# Patient Record
Sex: FEMALE | URBAN - METROPOLITAN AREA
[De-identification: names, ages, dates, MRNs, and addresses within clinical notes are randomized per-mention and may not be internally consistent; named-entity substitution may affect disease eponyms.]

---

## 2017-01-23 ENCOUNTER — INPATIENT (INPATIENT)
Facility: HOSPITAL | Age: 57
LOS: 0 days | Discharge: HOME | End: 2017-01-24
Attending: INTERNAL MEDICINE | Admitting: INTERNAL MEDICINE
Payer: COMMERCIAL

## 2017-01-23 VITALS
RESPIRATION RATE: 18 BRPM | TEMPERATURE: 99 F | OXYGEN SATURATION: 100 % | SYSTOLIC BLOOD PRESSURE: 148 MMHG | DIASTOLIC BLOOD PRESSURE: 82 MMHG | HEART RATE: 117 BPM

## 2017-01-23 DIAGNOSIS — I10 ESSENTIAL (PRIMARY) HYPERTENSION: ICD-10-CM

## 2017-01-23 DIAGNOSIS — Z87.59 PERSONAL HISTORY OF OTHER COMPLICATIONS OF PREGNANCY, CHILDBIRTH AND THE PUERPERIUM: Chronic | ICD-10-CM

## 2017-01-23 DIAGNOSIS — R00.0 TACHYCARDIA, UNSPECIFIED: ICD-10-CM

## 2017-01-23 DIAGNOSIS — E11.9 TYPE 2 DIABETES MELLITUS WITHOUT COMPLICATIONS: ICD-10-CM

## 2017-01-23 DIAGNOSIS — Z90.49 ACQUIRED ABSENCE OF OTHER SPECIFIED PARTS OF DIGESTIVE TRACT: Chronic | ICD-10-CM

## 2017-01-23 DIAGNOSIS — Z41.8 ENCOUNTER FOR OTHER PROCEDURES FOR PURPOSES OTHER THAN REMEDYING HEALTH STATE: ICD-10-CM

## 2017-01-23 LAB
APPEARANCE UR: SIGNIFICANT CHANGE UP
BACTERIA # UR AUTO: SIGNIFICANT CHANGE UP
BILIRUB UR-MCNC: NEGATIVE — SIGNIFICANT CHANGE UP
BLOOD UR QL VISUAL: NEGATIVE — SIGNIFICANT CHANGE UP
CK MB BLD-MCNC: 1 NG/ML — SIGNIFICANT CHANGE UP (ref 1–4.7)
COLOR SPEC: YELLOW — SIGNIFICANT CHANGE UP
GLUCOSE UR-MCNC: NEGATIVE — SIGNIFICANT CHANGE UP
HCG SERPL-ACNC: < 5 MIU/ML — SIGNIFICANT CHANGE UP
HCG UR-SCNC: NEGATIVE — SIGNIFICANT CHANGE UP
KETONES UR-MCNC: NEGATIVE — SIGNIFICANT CHANGE UP
LEUKOCYTE ESTERASE UR-ACNC: HIGH
MUCOUS THREADS # UR AUTO: SIGNIFICANT CHANGE UP
NITRITE UR-MCNC: NEGATIVE — SIGNIFICANT CHANGE UP
PH UR: 7 — SIGNIFICANT CHANGE UP (ref 4.6–8)
PROT UR-MCNC: 20 — SIGNIFICANT CHANGE UP
RBC CASTS # UR COMP ASSIST: SIGNIFICANT CHANGE UP (ref 0–?)
SP GR SPEC: 1.02 — SIGNIFICANT CHANGE UP (ref 1–1.03)
SQUAMOUS # UR AUTO: SIGNIFICANT CHANGE UP
TROPONIN T SERPL-MCNC: < 0.06 NG/ML — SIGNIFICANT CHANGE UP (ref 0–0.06)
UROBILINOGEN FLD QL: NORMAL E.U. — SIGNIFICANT CHANGE UP (ref 0.1–0.2)
WBC UR QL: SIGNIFICANT CHANGE UP (ref 0–?)

## 2017-01-23 PROCEDURE — 71020: CPT | Mod: 26

## 2017-01-23 PROCEDURE — 71275 CT ANGIOGRAPHY CHEST: CPT | Mod: 26

## 2017-01-23 RX ORDER — DEXTROSE 50 % IN WATER 50 %
25 SYRINGE (ML) INTRAVENOUS ONCE
Qty: 0 | Refills: 0 | Status: DISCONTINUED | OUTPATIENT
Start: 2017-01-23 | End: 2017-01-24

## 2017-01-23 RX ORDER — LOSARTAN POTASSIUM 100 MG/1
25 TABLET, FILM COATED ORAL DAILY
Qty: 0 | Refills: 0 | Status: DISCONTINUED | OUTPATIENT
Start: 2017-01-23 | End: 2017-01-24

## 2017-01-23 RX ORDER — INSULIN LISPRO 100/ML
VIAL (ML) SUBCUTANEOUS
Qty: 0 | Refills: 0 | Status: DISCONTINUED | OUTPATIENT
Start: 2017-01-23 | End: 2017-01-24

## 2017-01-23 RX ORDER — DEXTROSE 50 % IN WATER 50 %
1 SYRINGE (ML) INTRAVENOUS ONCE
Qty: 0 | Refills: 0 | Status: DISCONTINUED | OUTPATIENT
Start: 2017-01-23 | End: 2017-01-24

## 2017-01-23 RX ORDER — ASPIRIN/CALCIUM CARB/MAGNESIUM 324 MG
162 TABLET ORAL ONCE
Qty: 0 | Refills: 0 | Status: COMPLETED | OUTPATIENT
Start: 2017-01-23 | End: 2017-01-23

## 2017-01-23 RX ORDER — INSULIN LISPRO 100/ML
VIAL (ML) SUBCUTANEOUS AT BEDTIME
Qty: 0 | Refills: 0 | Status: DISCONTINUED | OUTPATIENT
Start: 2017-01-23 | End: 2017-01-24

## 2017-01-23 RX ORDER — DEXTROSE 50 % IN WATER 50 %
12.5 SYRINGE (ML) INTRAVENOUS ONCE
Qty: 0 | Refills: 0 | Status: DISCONTINUED | OUTPATIENT
Start: 2017-01-23 | End: 2017-01-24

## 2017-01-23 RX ORDER — SODIUM CHLORIDE 9 MG/ML
1000 INJECTION, SOLUTION INTRAVENOUS
Qty: 0 | Refills: 0 | Status: DISCONTINUED | OUTPATIENT
Start: 2017-01-23 | End: 2017-01-24

## 2017-01-23 RX ORDER — ASPIRIN/CALCIUM CARB/MAGNESIUM 324 MG
81 TABLET ORAL DAILY
Qty: 0 | Refills: 0 | Status: DISCONTINUED | OUTPATIENT
Start: 2017-01-24 | End: 2017-01-24

## 2017-01-23 RX ORDER — CEFTRIAXONE 500 MG/1
1 INJECTION, POWDER, FOR SOLUTION INTRAMUSCULAR; INTRAVENOUS ONCE
Qty: 0 | Refills: 0 | Status: COMPLETED | OUTPATIENT
Start: 2017-01-23 | End: 2017-01-23

## 2017-01-23 RX ORDER — GLUCAGON INJECTION, SOLUTION 0.5 MG/.1ML
1 INJECTION, SOLUTION SUBCUTANEOUS ONCE
Qty: 0 | Refills: 0 | Status: DISCONTINUED | OUTPATIENT
Start: 2017-01-23 | End: 2017-01-24

## 2017-01-23 RX ORDER — AMLODIPINE BESYLATE 2.5 MG/1
10 TABLET ORAL DAILY
Qty: 0 | Refills: 0 | Status: DISCONTINUED | OUTPATIENT
Start: 2017-01-23 | End: 2017-01-24

## 2017-01-23 RX ORDER — HEPARIN SODIUM 5000 [USP'U]/ML
5000 INJECTION INTRAVENOUS; SUBCUTANEOUS EVERY 12 HOURS
Qty: 0 | Refills: 0 | Status: DISCONTINUED | OUTPATIENT
Start: 2017-01-23 | End: 2017-01-24

## 2017-01-23 RX ORDER — SODIUM CHLORIDE 9 MG/ML
1000 INJECTION INTRAMUSCULAR; INTRAVENOUS; SUBCUTANEOUS ONCE
Qty: 0 | Refills: 0 | Status: COMPLETED | OUTPATIENT
Start: 2017-01-23 | End: 2017-01-23

## 2017-01-23 RX ADMIN — Medication 162 MILLIGRAM(S): at 14:06

## 2017-01-23 RX ADMIN — CEFTRIAXONE 100 GRAM(S): 500 INJECTION, POWDER, FOR SOLUTION INTRAMUSCULAR; INTRAVENOUS at 21:00

## 2017-01-23 RX ADMIN — SODIUM CHLORIDE 2000 MILLILITER(S): 9 INJECTION INTRAMUSCULAR; INTRAVENOUS; SUBCUTANEOUS at 23:18

## 2017-01-23 NOTE — H&P ADULT. - NEGATIVE ENMT SYMPTOMS
no hearing difficulty/no nasal obstruction no hearing difficulty/no sinus symptoms/no tinnitus/no nasal congestion/no ear pain/no vertigo/no nasal discharge

## 2017-01-23 NOTE — ED PROVIDER NOTE - MEDICAL DECISION MAKING DETAILS
57yo female with pmh of HTN (amlodipine, losartan, metoprolol) presents with tachycardia - r/o PE vs thyroidism vs acs

## 2017-01-23 NOTE — H&P ADULT. - ASSESSMENT
59 y/o F with PMH of HTN, DM presented from cardiologist office for tachycardia and palpitations. R/o ACS

## 2017-01-23 NOTE — H&P ADULT. - PROBLEM SELECTOR PLAN 2
UA revealed large leuks, negative nitrites. Patient mildly febrile Temp of 99.8. Patient did endorse chills. WBC count of 12.39 and mildly tachycardic HR of 105-117   Will give 1x dose of IV Ceftriaxone 1 gram in the ED  Urine cultures ordered

## 2017-01-23 NOTE — H&P ADULT. - NEGATIVE NEUROLOGICAL SYMPTOMS
no weakness/no loss of sensation/no facial palsy/no vertigo/no difficulty walking no loss of consciousness/no hemiparesis/no facial palsy/no transient paralysis/no weakness/no difficulty walking/no paresthesias/no generalized seizures/no syncope/no confusion/no vertigo/no headache/no focal seizures/no tremors/no loss of sensation

## 2017-01-23 NOTE — H&P ADULT. - GASTROINTESTINAL DETAILS
no rebound tenderness/bowel sounds normal/nontender/no rigidity/soft/normal/no guarding nontender/no rebound tenderness/no rigidity/soft/normal/bowel sounds normal/no distention/no guarding

## 2017-01-23 NOTE — H&P ADULT. - NEGATIVE GASTROINTESTINAL SYMPTOMS
no constipation/no nausea/no vomiting/no diarrhea no melena/no abdominal pain/no diarrhea/no vomiting/no nausea/no constipation/no change in bowel habits/no hematochezia

## 2017-01-23 NOTE — ED PROVIDER NOTE - NS ED MD SHIFT CHANGE PLANNED DISPOSITION
admit to hospital as inpatient/pending test results documented on template/clinical impression documented on template

## 2017-01-23 NOTE — H&P ADULT. - MUSCULOSKELETAL
details… detailed exam no joint erythema/normal/ROM intact no joint warmth/no calf tenderness/no joint swelling/no joint erythema

## 2017-01-23 NOTE — H&P ADULT. - NEUROLOGICAL DETAILS
sensation intact/alert and oriented x 3/normal strength responds to verbal commands/alert and oriented x 3/sensation intact

## 2017-01-23 NOTE — H&P ADULT. - RS GEN PE MLT RESP DETAILS PC
normal/no wheezes/no chest wall tenderness/no rhonchi/diminished breath sounds at bases/no rales airway patent/no rales/no chest wall tenderness/no intercostal retractions/normal/no wheezes/respirations non-labored/rhonchi

## 2017-01-23 NOTE — H&P ADULT. - NEGATIVE MUSCULOSKELETAL SYMPTOMS
no arthralgia/no arthritis/no joint swelling/no back pain no muscle cramps/no muscle weakness/no stiffness/no neck pain/no myalgia/no arthritis/no joint swelling/no arthralgia

## 2017-01-23 NOTE — H&P ADULT. - EYES
detailed exam PERRL/EOMI/conjunctiva clear/conjunctiva inflamed conjunctiva clear/conjunctiva inflamed

## 2017-01-23 NOTE — ED PROVIDER NOTE - OBJECTIVE STATEMENT
57yo female with pmh of HTN (amlodipine, losartan, metoprolol) presents with tachycardia. 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 Pt denies feeling palpitations. Pt states last night had a seconds episode of sharp left sided cp to arm quickly resolved and has not reoccured. Pt went to cardiologist, Dr. Jaime, today did ekg with new TWI and sent to ED for eval. Dr. Jaime note also states pt appears sob. Pt denies abd pain/n/v/d, urinary symptoms, cp, fevers/chills, ho ocp use, ho clots, recent travel and sickness.

## 2017-01-23 NOTE — H&P ADULT. - HISTORY OF PRESENT ILLNESS
59 y/o female with a hx of HTN, DM presents to the office c/o acute, constant, moderate, racing heart rate for 1 day. The patient reports that she had a sharp pain in her hear and her left hand last night for a few seconds that resolved spontaneously The next morning while at work the patient noticed she didn't feel well, that something was off. She felt her heart racing.  She took her BP which was around the 110-120 and rec'd and EKG which "looked weird." She was sent to Dr. Jaime her Cardiologist who felt the patient was short of breath and sent her to the ER.  The patient had a previous episode of this "racing heart rate" last year in which she'd rec'd a full work up which was negative. The patient is currently complaining of a waxing and waning, dull, left, temporal headache associated with carotid pulsation today. The patient noted the left side of her neck is bigger. The patient denies fever, chills, diaphoresis, CP, palpitations, shortness of breath, wheezing, cough, weakness, numbness, change in vision, change in hearing, neck pain, back pain,  heat intolerance/ cold intolerance, nausea, vomiting, diarrhea, abdominal pain, dysuria, hematuria, leg pain, calf pain, skin changes, anxiety, depression. 57 y/o F with PMH of HTN, DM presented from cardiologist office for tachycardia and palpitations. As per the patient she had a follow up visit with her cardiologist tomorrow 1/24 but on Sunday she developed sudden onset palpitations while she was resting in bed. The palpitations lasted few seconds without any aggravating or alleviating factors. Patient stated that she went to work today and "she did not feel well" and had an EKG done at work "which did not look right" and she decided to go to her cardiologist office today. Patient stated that she took her blood pressure at the place she worked and it was noted to be 150/90. While at cardiologist office and had an EKG done which revealed new TWI in lead III and was told to come to the ER. Patient stated that she had similar complaint in June and was started on Metoprolol but was taken off the medication few months ago. Patient denied any CP, SOB, palpitations, headaches, N/V/D/C, fever, cough, abdominal pain, melena, hematochezia, recent travel, sick contact, pleuritic or positional chest pain. Of note patient did endorse chills when she came to the ER.     On ED admission EKG revealed Sinus tachycardia at a rate of 111, CE x 1: Negative, WBC: 12.39, BNP: 26.7, UA: Large Leuks, negative nitrites. CXR: Clear lungs. CTPA: No pulmonary embolism. Patient was seen by cardiology who recommended check CTPA if negative, check nuclear stress in am. When examined patient is resting in the stretcher and denied any current CP, palpitations or SOB.

## 2017-01-23 NOTE — H&P ADULT. - NEGATIVE OPHTHALMOLOGIC SYMPTOMS
no blurred vision L/no blurred vision R/no loss of vision L/no loss of vision R/no diplopia/no photophobia no blurred vision R/no blurred vision L/no discharge R/no lacrimation L/no diplopia/no photophobia/no lacrimation R/no discharge L

## 2017-01-23 NOTE — H&P ADULT. - PROBLEM SELECTOR PLAN 1
Differential include PE vs underline UTI. Patient had CTPA which was negative for PE  Will admit to telemetry, serial EKG, serial Ricki prn chest pain   f/u MD note   HgBA1C, TSH, lipid profile, CBC, CMP in am   Nuclear stress test ordered   Continue with Aspirin 81mg daily

## 2017-01-23 NOTE — H&P ADULT. - NEGATIVE GENERAL SYMPTOMS
no fatigue/no sweating/no anorexia/no weight loss/no chills/no weight gain/no malaise/no fever no fever/no fatigue/no malaise/no sweating/no weight loss/no weight gain/no anorexia

## 2017-01-23 NOTE — ED PROVIDER NOTE - PROGRESS NOTE DETAILS
Kylah BEDOYA - pending ct chest signed out to Dr. Walsh Discussed need to admit with patient & discussed risk and benefits.  Patient agreed to admission.  Discussed case w/ admitting cardiologist - agreed to admit to their service.  Tele PA contacted.

## 2017-01-23 NOTE — ED PROVIDER NOTE - ATTENDING CONTRIBUTION TO CARE
Kylah BEDOYA - pt with h/o htn, hld p/w palpitations this morning and had transient sharp pleuritic chest pain last night which resolved spontaneously, no syncope , fever, chills. No nausea, vomiting, diarrhea. Pt had similar episode last year. Pt was sent by PMD because she had t wave inversion on her ekg .Pt had normal stress test last year, non smoker, no fhx of cad or blood clots    pt is alert, well appearing female, s1s2 normal reg, b/l clear breathe sounds, abd soft, nt,  nd, neuro exam aox3, cn 2-12 intact, skin warm, dry, good turgor, neck thyroid appears enlarged    ddx: arrhythmia, acs, pe, hyperthyroidism

## 2017-01-23 NOTE — ED ADULT NURSE NOTE - OBJECTIVE STATEMENT
pt received to room #3, sent in by cardiologist for new T wave inversions. pt went to MD for c/o of palpations. pt denies cp. currently denies symptoms. was due to have a stress test on Friday. Denies SOB, although cardiologist endorsed pt appearing SOB.  denies any new diets and coffee use. on , NSR noted. IV placed, labs drawn and sent, pt seen by resident. family at bedside, will cont to monitor.

## 2017-01-23 NOTE — ED ADULT TRIAGE NOTE - CHIEF COMPLAINT QUOTE
Pt sent by pcp for midsternal chest pain and sob since this morning. Abnormal ekg at doctor's office. Hx of diabetes. FS 94

## 2017-01-23 NOTE — ED PROVIDER NOTE - SHIFT CHANGE DETAILS
pending ct chest signed out to Dr. Walsh, if ct chest neg plan to admit fro further acs and palpitations work up

## 2017-01-23 NOTE — H&P ADULT. - NEGATIVE CARDIOVASCULAR SYMPTOMS
no dyspnea on exertion/no chest pain/no palpitations/no peripheral edema/no orthopnea/no paroxysmal nocturnal dyspnea/no claudication no dyspnea on exertion/no chest pain/no palpitations/no orthopnea/no paroxysmal nocturnal dyspnea/no peripheral edema

## 2017-01-24 VITALS
HEART RATE: 86 BPM | DIASTOLIC BLOOD PRESSURE: 56 MMHG | RESPIRATION RATE: 18 BRPM | TEMPERATURE: 98 F | OXYGEN SATURATION: 98 % | SYSTOLIC BLOOD PRESSURE: 103 MMHG

## 2017-01-24 LAB
CHOLEST SERPL-MCNC: 176 MG/DL — SIGNIFICANT CHANGE UP (ref 120–199)
CK MB BLD-MCNC: SIGNIFICANT CHANGE UP (ref 0–2.5)
CK SERPL-CCNC: 133 U/L — SIGNIFICANT CHANGE UP (ref 25–170)
HBA1C BLD-MCNC: 6.6 % — HIGH (ref 4–5.6)
HCT VFR BLD CALC: 35.6 % — SIGNIFICANT CHANGE UP (ref 34.5–45)
HDLC SERPL-MCNC: 53 MG/DL — SIGNIFICANT CHANGE UP (ref 45–65)
HGB BLD-MCNC: 11.3 G/DL — LOW (ref 11.5–15.5)
LIPID PNL WITH DIRECT LDL SERPL: 109 MG/DL — SIGNIFICANT CHANGE UP
MCHC RBC-ENTMCNC: 27.8 PG — SIGNIFICANT CHANGE UP (ref 27–34)
MCHC RBC-ENTMCNC: 31.7 % — LOW (ref 32–36)
MCV RBC AUTO: 87.7 FL — SIGNIFICANT CHANGE UP (ref 80–100)
PLATELET # BLD AUTO: 275 K/UL — SIGNIFICANT CHANGE UP (ref 150–400)
PMV BLD: 10.6 FL — SIGNIFICANT CHANGE UP (ref 7–13)
RBC # BLD: 4.06 M/UL — SIGNIFICANT CHANGE UP (ref 3.8–5.2)
RBC # FLD: 15.4 % — HIGH (ref 10.3–14.5)
TRIGL SERPL-MCNC: 60 MG/DL — SIGNIFICANT CHANGE UP (ref 10–149)
WBC # BLD: 11.29 K/UL — HIGH (ref 3.8–10.5)
WBC # FLD AUTO: 11.29 K/UL — HIGH (ref 3.8–10.5)

## 2017-01-24 PROCEDURE — 93018 CV STRESS TEST I&R ONLY: CPT | Mod: GC

## 2017-01-24 PROCEDURE — 78452 HT MUSCLE IMAGE SPECT MULT: CPT | Mod: 26

## 2017-01-24 PROCEDURE — 93016 CV STRESS TEST SUPVJ ONLY: CPT | Mod: GC

## 2017-01-24 RX ORDER — ASPIRIN/CALCIUM CARB/MAGNESIUM 324 MG
1 TABLET ORAL
Qty: 0 | Refills: 0 | COMMUNITY
Start: 2017-01-24

## 2017-01-24 RX ADMIN — HEPARIN SODIUM 5000 UNIT(S): 5000 INJECTION INTRAVENOUS; SUBCUTANEOUS at 06:04

## 2017-01-24 RX ADMIN — AMLODIPINE BESYLATE 10 MILLIGRAM(S): 2.5 TABLET ORAL at 11:24

## 2017-01-24 RX ADMIN — Medication 81 MILLIGRAM(S): at 11:24

## 2017-01-24 RX ADMIN — LOSARTAN POTASSIUM 25 MILLIGRAM(S): 100 TABLET, FILM COATED ORAL at 11:24

## 2017-01-24 NOTE — DISCHARGE NOTE ADULT - CARE PROVIDER_API CALL
Jaylene Tovar), Cardiovascular Disease; Internal Medicine; Interventional Cardiology  [address] [facility] [address] [place] [address]  Phone: [phone]  Fax: [phone]

## 2017-01-24 NOTE — DISCHARGE NOTE ADULT - CARE PLAN
Principal Discharge DX:	Tachycardia  Goal:	Follow up with your doctors  Instructions for follow-up, activity and diet:	Please follow up with your doctors in 1-2 weeks. Information for Dr. Tovar provided below. Continue your medications as prescribed.  Secondary Diagnosis:	Essential hypertension  Instructions for follow-up, activity and diet:	Continue medications as currently prescribed. Follow up with your PMD for further management of disease. Dash diet.  Secondary Diagnosis:	Diabetes  Instructions for follow-up, activity and diet:	Continue diet modification. Avoid complex carbohydrates such as bread, pasta, cereal, white rice, white potatoes, etc. Avoid concentrated sugar as found in desserts, candy, soda, juice, etc. Consume a diet based on lean protein (chicken, fish) and vegetables.

## 2017-01-24 NOTE — DISCHARGE NOTE ADULT - PLAN OF CARE
Continue medications as currently prescribed. Follow up with your PMD for further management of disease. Dash diet. Continue diet modification. Avoid complex carbohydrates such as bread, pasta, cereal, white rice, white potatoes, etc. Avoid concentrated sugar as found in desserts, candy, soda, juice, etc. Consume a diet based on lean protein (chicken, fish) and vegetables. Follow up with your doctors Please follow up with your doctors in 1-2 weeks. Information for Dr. Tovar provided below. Continue your medications as prescribed.

## 2017-01-24 NOTE — DISCHARGE NOTE ADULT - MEDICATION SUMMARY - MEDICATIONS TO TAKE
I will START or STAY ON the medications listed below when I get home from the hospital:    aspirin 81 mg oral tablet, chewable  -- 1 tab(s) by mouth once a day  -- Indication: For TIA    losartan 25 mg oral tablet  -- 1 tab(s) by mouth once a day  -- Indication: For HTN (hypertension)    glipiZIDE 5 mg oral tablet  -- 1 tab(s) by mouth once a day  -- Indication: For Diabetes    amLODIPine 10 mg oral tablet  -- 1 tab(s) by mouth once a day  -- Indication: For HTN (hypertension)

## 2017-01-24 NOTE — DISCHARGE NOTE ADULT - HOSPITAL COURSE
57 y/o F with PMH of HTN, DM presented from cardiologist office for tachycardia and palpitations. As per the patient she had a follow up visit with her cardiologist tomorrow 1/24 but on Sunday she developed sudden onset palpitations while she was resting in bed. The palpitations lasted few seconds without any aggravating or alleviating factors. Patient stated that she went to work today and "she did not feel well" and had an EKG done at work "which did not look right" and she decided to go to her cardiologist office today. Patient stated that she took her blood pressure at the place she worked and it was noted to be 150/90. While at cardiologist office and had an EKG done which revealed new TWI in lead III and was told to come to the ER. Patient stated that she had similar complaint in June and was started on Metoprolol but was taken off the medication few months ago. Patient denied any CP, SOB, palpitations, headaches, N/V/D/C, fever, cough, abdominal pain, melena, hematochezia, recent travel, sick contact, pleuritic or positional chest pain. Of note patient did endorse chills when she came to the ER.     On admission:  EKG: Sinus tachycardia at a rate of 111  CE x 2: Negative   WBC: 12.39  BNP: 26.7  UA: Large Leuks, negative nitrites   CXR: Clear lungs  CTPA: No pulmonary embolism    patient experienced a vasovagal episode when bloodwork was being drawn. BP dropped to 74/40 but improved after a bolus of IVF..    Nuclear stress test revealed ... 59 y/o F with PMH of HTN, DM presented from cardiologist office for tachycardia and palpitations. As per the patient she had a follow up visit with her cardiologist tomorrow 1/24 but on Sunday she developed sudden onset palpitations while she was resting in bed. The palpitations lasted few seconds without any aggravating or alleviating factors. Patient stated that she went to work today and "she did not feel well" and had an EKG done at work "which did not look right" and she decided to go to her cardiologist office today. Patient stated that she took her blood pressure at the place she worked and it was noted to be 150/90. While at cardiologist office and had an EKG done which revealed new TWI in lead III and was told to come to the ER. Patient stated that she had similar complaint in June and was started on Metoprolol but was taken off the medication few months ago. Patient denied any CP, SOB, palpitations, headaches, N/V/D/C, fever, cough, abdominal pain, melena, hematochezia, recent travel, sick contact, pleuritic or positional chest pain. Of note patient did endorse chills when she came to the ER.     On admission:  EKG: Sinus tachycardia at a rate of 111  CE x 2: Negative   WBC: 12.39  BNP: 26.7  UA: Large Leuks, negative nitrites   CXR: Clear lungs  CTPA: No pulmonary embolism    patient experienced a vasovagal episode when bloodwork was being drawn. BP dropped to 74/40 but improved after a bolus of IVF..    Nuclear stress test revealed a normal study.    Cleared for discharge.

## 2017-01-24 NOTE — PATIENT PROFILE ADULT. - NS TRANSFER PATIENT BELONGINGS
Clothing/pair earrings, , 5 credit cards, $82.00, wallet, purse, necklace/Other belongings/Cell Phone/PDA (specify)/Jewelry/Money (specify)

## 2017-01-24 NOTE — DISCHARGE NOTE ADULT - PATIENT PORTAL LINK FT
“You can access the Follow[employer] Patient Portal, offered by [facility], by registering with the following website: [email or web address]”

## 2017-01-25 LAB — SPECIMEN SOURCE: SIGNIFICANT CHANGE UP

## 2017-01-26 LAB — BACTERIA UR CULT: SIGNIFICANT CHANGE UP

## 2017-04-19 PROBLEM — Z00.00 ENCOUNTER FOR PREVENTIVE HEALTH EXAMINATION: Status: ACTIVE | Noted: 2017-04-19

## 2017-10-26 RX ORDER — LOSARTAN POTASSIUM 100 MG/1
1 TABLET, FILM COATED ORAL
Qty: 0 | Refills: 0 | COMMUNITY

## 2017-10-26 RX ORDER — AMLODIPINE BESYLATE 2.5 MG/1
1 TABLET ORAL
Qty: 0 | Refills: 0 | COMMUNITY

## 2017-10-27 ENCOUNTER — RESULT REVIEW (OUTPATIENT)
Age: 57
End: 2017-10-27

## 2019-06-04 NOTE — H&P ADULT. - [facility] HE PE MLT D E PC
Called patient to check on reason for visit. Also corrected some of the patients demographic information per request.   
no rub/no murmur

## 2021-03-23 ENCOUNTER — RESULT REVIEW (OUTPATIENT)
Age: 61
End: 2021-03-23

## 2021-07-16 NOTE — ED ADULT NURSE NOTE - NS TRANSFER PATIENT BELONGINGS
Simponi Pregnancy And Lactation Text: The risk during pregnancy and breastfeeding is uncertain with this medication. Clothing

## 2022-03-28 NOTE — H&P ADULT. - NEGATIVE RESPIRATORY AND THORAX SYMPTOMS
Tetracycline Counseling: Patient counseled regarding possible photosensitivity and increased risk for sunburn.  Patient instructed to avoid sunlight, if possible.  When exposed to sunlight, patients should wear protective clothing, sunglasses, and sunscreen.  The patient was instructed to call the office immediately if the following severe adverse effects occur:  hearing changes, easy bruising/bleeding, severe headache, or vision changes.  The patient verbalized understanding of the proper use and possible adverse effects of tetracycline.  All of the patient's questions and concerns were addressed. Patient understands to avoid pregnancy while on therapy due to potential birth defects. Bactrim Counseling:  I discussed with the patient the risks of sulfa antibiotics including but not limited to GI upset, allergic reaction, drug rash, diarrhea, dizziness, photosensitivity, and yeast infections.  Rarely, more serious reactions can occur including but not limited to aplastic anemia, agranulocytosis, methemoglobinemia, blood dyscrasias, liver or kidney failure, lung infiltrates or desquamative/blistering drug rashes. Include Pregnancy/Lactation Warning?: No Dapsone Pregnancy And Lactation Text: This medication is Pregnancy Category C and is not considered safe during pregnancy or breast feeding. High Dose Vitamin A Counseling: Side effects reviewed, pt to contact office should one occur. Doxycycline Pregnancy And Lactation Text: This medication is Pregnancy Category D and not consider safe during pregnancy. It is also excreted in breast milk but is considered safe for shorter treatment courses. Erythromycin Pregnancy And Lactation Text: This medication is Pregnancy Category B and is considered safe during pregnancy. It is also excreted in breast milk. Erythromycin Counseling:  I discussed with the patient the risks of erythromycin including but not limited to GI upset, allergic reaction, drug rash, diarrhea, increase in liver enzymes, and yeast infections. Minocycline Counseling: Patient advised regarding possible photosensitivity and discoloration of the teeth, skin, lips, tongue and gums.  Patient instructed to avoid sunlight, if possible.  When exposed to sunlight, patients should wear protective clothing, sunglasses, and sunscreen.  The patient was instructed to call the office immediately if the following severe adverse effects occur:  hearing changes, easy bruising/bleeding, severe headache, or vision changes.  The patient verbalized understanding of the proper use and possible adverse effects of minocycline.  All of the patient's questions and concerns were addressed. Topical Sulfur Applications Pregnancy And Lactation Text: This medication is Pregnancy Category C and has an unknown safety profile during pregnancy. It is unknown if this topical medication is excreted in breast milk. High Dose Vitamin A Pregnancy And Lactation Text: High dose vitamin A therapy is contraindicated during pregnancy and breast feeding. Detail Level: Zone Winlevi Pregnancy And Lactation Text: This medication is considered safe during pregnancy and breastfeeding. Topical Clindamycin Pregnancy And Lactation Text: This medication is Pregnancy Category B and is considered safe during pregnancy. It is unknown if it is excreted in breast milk. Topical Retinoid counseling:  Patient advised to apply a pea-sized amount only at bedtime and wait 30 minutes after washing their face before applying.  If too drying, patient may add a non-comedogenic moisturizer. The patient verbalized understanding of the proper use and possible adverse effects of retinoids.  All of the patient's questions and concerns were addressed. Spironolactone Counseling: Patient advised regarding risks of diarrhea, abdominal pain, hyperkalemia, birth defects (for female patients), liver toxicity and renal toxicity. The patient may need blood work to monitor liver and kidney function and potassium levels while on therapy. The patient verbalized understanding of the proper use and possible adverse effects of spironolactone.  All of the patient's questions and concerns were addressed. Topical Clindamycin Counseling: Patient counseled that this medication may cause skin irritation or allergic reactions.  In the event of skin irritation, the patient was advised to reduce the amount of the drug applied or use it less frequently.   The patient verbalized understanding of the proper use and possible adverse effects of clindamycin.  All of the patient's questions and concerns were addressed. Benzoyl Peroxide Pregnancy And Lactation Text: This medication is Pregnancy Category C. It is unknown if benzoyl peroxide is excreted in breast milk. Tazorac Pregnancy And Lactation Text: This medication is not safe during pregnancy. It is unknown if this medication is excreted in breast milk. Sarecycline Counseling: Patient advised regarding possible photosensitivity and discoloration of the teeth, skin, lips, tongue and gums.  Patient instructed to avoid sunlight, if possible.  When exposed to sunlight, patients should wear protective clothing, sunglasses, and sunscreen.  The patient was instructed to call the office immediately if the following severe adverse effects occur:  hearing changes, easy bruising/bleeding, severe headache, or vision changes.  The patient verbalized understanding of the proper use and possible adverse effects of sarecycline.  All of the patient's questions and concerns were addressed. Doxycycline Counseling:  Patient counseled regarding possible photosensitivity and increased risk for sunburn.  Patient instructed to avoid sunlight, if possible.  When exposed to sunlight, patients should wear protective clothing, sunglasses, and sunscreen.  The patient was instructed to call the office immediately if the following severe adverse effects occur:  hearing changes, easy bruising/bleeding, severe headache, or vision changes.  The patient verbalized understanding of the proper use and possible adverse effects of doxycycline.  All of the patient's questions and concerns were addressed. Bactrim Pregnancy And Lactation Text: This medication is Pregnancy Category D and is known to cause fetal risk.  It is also excreted in breast milk. Topical Sulfur Applications Counseling: Topical Sulfur Counseling: Patient counseled that this medication may cause skin irritation or allergic reactions.  In the event of skin irritation, the patient was advised to reduce the amount of the drug applied or use it less frequently.   The patient verbalized understanding of the proper use and possible adverse effects of topical sulfur application.  All of the patient's questions and concerns were addressed. Azelaic Acid Pregnancy And Lactation Text: This medication is considered safe during pregnancy and breast feeding. Minocycline Pregnancy And Lactation Text: This medication is Pregnancy Category D and not consider safe during pregnancy. It is also excreted in breast milk. Azithromycin Counseling:  I discussed with the patient the risks of azithromycin including but not limited to GI upset, allergic reaction, drug rash, diarrhea, and yeast infections. Azithromycin Pregnancy And Lactation Text: This medication is considered safe during pregnancy and is also secreted in breast milk. Isotretinoin Pregnancy And Lactation Text: This medication is Pregnancy Category X and is considered extremely dangerous during pregnancy. It is unknown if it is excreted in breast milk. Benzoyl Peroxide Counseling: Patient counseled that medicine may cause skin irritation and bleach clothing.  In the event of skin irritation, the patient was advised to reduce the amount of the drug applied or use it less frequently.   The patient verbalized understanding of the proper use and possible adverse effects of benzoyl peroxide.  All of the patient's questions and concerns were addressed. Spironolactone Pregnancy And Lactation Text: This medication can cause feminization of the male fetus and should be avoided during pregnancy. The active metabolite is also found in breast milk. Winlevi Counseling:  I discussed with the patient the risks of topical clascoterone including but not limited to erythema, scaling, itching, and stinging. Patient voiced their understanding. Dapsone Counseling: I discussed with the patient the risks of dapsone including but not limited to hemolytic anemia, agranulocytosis, rashes, methemoglobinemia, kidney failure, peripheral neuropathy, headaches, GI upset, and liver toxicity.  Patients who start dapsone require monitoring including baseline LFTs and weekly CBCs for the first month, then every month thereafter.  The patient verbalized understanding of the proper use and possible adverse effects of dapsone.  All of the patient's questions and concerns were addressed. Tazorac Counseling:  Patient advised that medication is irritating and drying.  Patient may need to apply sparingly and wash off after an hour before eventually leaving it on overnight.  The patient verbalized understanding of the proper use and possible adverse effects of tazorac.  All of the patient's questions and concerns were addressed. Birth Control Pills Counseling: Birth Control Pill Counseling: I discussed with the patient the potential side effects of OCPs including but not limited to increased risk of stroke, heart attack, thrombophlebitis, deep venous thrombosis, hepatic adenomas, breast changes, GI upset, headaches, and depression.  The patient verbalized understanding of the proper use and possible adverse effects of OCPs. All of the patient's questions and concerns were addressed. Azelaic Acid Counseling: Patient counseled that medicine may cause skin irritation and to avoid applying near the eyes.  In the event of skin irritation, the patient was advised to reduce the amount of the drug applied or use it less frequently.   The patient verbalized understanding of the proper use and possible adverse effects of azelaic acid.  All of the patient's questions and concerns were addressed. Birth Control Pills Pregnancy And Lactation Text: This medication should be avoided if pregnant and for the first 30 days post-partum. Topical Retinoid Pregnancy And Lactation Text: This medication is Pregnancy Category C. It is unknown if this medication is excreted in breast milk. Isotretinoin Counseling: Patient should get monthly blood tests, not donate blood, not drive at night if vision affected, not share medication, and not undergo elective surgery for 6 months after tx completed. Side effects reviewed, pt to contact office should one occur. no wheezing/no dyspnea/no hemoptysis/no cough/no pleuritic chest pain

## 2024-11-25 NOTE — ED ADULT TRIAGE NOTE - CADM TRG TX PRIOR TO ARRIVAL
States her nausea went from 8 to 4 after 2nd dose of Zofran.  IV Bag 0.9NS infusing and site patent.  Dr.Weiler at side.  See VS.  Sips of water given with Tylenol 1gram PO as ordered.    Electronically signed by Marta Mejia RN.     none